# Patient Record
Sex: FEMALE | Race: WHITE | NOT HISPANIC OR LATINO | Employment: UNEMPLOYED | URBAN - METROPOLITAN AREA
[De-identification: names, ages, dates, MRNs, and addresses within clinical notes are randomized per-mention and may not be internally consistent; named-entity substitution may affect disease eponyms.]

---

## 2022-05-12 ENCOUNTER — APPOINTMENT (OUTPATIENT)
Dept: CT IMAGING | Facility: HOSPITAL | Age: 37
End: 2022-05-12

## 2022-05-12 ENCOUNTER — HOSPITAL ENCOUNTER (EMERGENCY)
Facility: HOSPITAL | Age: 37
Discharge: HOME | End: 2022-05-12
Attending: EMERGENCY MEDICINE | Admitting: EMERGENCY MEDICINE

## 2022-05-12 VITALS
WEIGHT: 160 LBS | OXYGEN SATURATION: 100 % | DIASTOLIC BLOOD PRESSURE: 93 MMHG | SYSTOLIC BLOOD PRESSURE: 141 MMHG | BODY MASS INDEX: 23.7 KG/M2 | TEMPERATURE: 98.3 F | RESPIRATION RATE: 16 BRPM | HEART RATE: 72 BPM | HEIGHT: 69 IN

## 2022-05-12 DIAGNOSIS — S09.93XA FACIAL INJURY, INITIAL ENCOUNTER: Primary | ICD-10-CM

## 2022-05-12 DIAGNOSIS — J34.89 RHINORRHEA: ICD-10-CM

## 2022-05-12 DIAGNOSIS — S01.511A LIP LACERATION, INITIAL ENCOUNTER: ICD-10-CM

## 2022-05-12 PROCEDURE — 70450 CT HEAD/BRAIN W/O DYE: CPT

## 2022-05-12 PROCEDURE — 96372 THER/PROPH/DIAG INJ SC/IM: CPT

## 2022-05-12 PROCEDURE — G0009 ADMIN PNEUMOCOCCAL VACCINE: HCPCS | Performed by: EMERGENCY MEDICINE

## 2022-05-12 PROCEDURE — 99282 EMERGENCY DEPT VISIT SF MDM: CPT | Performed by: STUDENT IN AN ORGANIZED HEALTH CARE EDUCATION/TRAINING PROGRAM

## 2022-05-12 PROCEDURE — 90732 PPSV23 VACC 2 YRS+ SUBQ/IM: CPT | Performed by: EMERGENCY MEDICINE

## 2022-05-12 PROCEDURE — 70486 CT MAXILLOFACIAL W/O DYE: CPT

## 2022-05-12 PROCEDURE — 25010000002 PNEUMOCOCCAL VAC POLYVALENT PER 0.5 ML: Performed by: EMERGENCY MEDICINE

## 2022-05-12 PROCEDURE — 99282 EMERGENCY DEPT VISIT SF MDM: CPT

## 2022-05-12 RX ADMIN — PNEUMOCOCCAL VACCINE POLYVALENT 0.5 ML
25; 25; 25; 25; 25; 25; 25; 25; 25; 25; 25; 25; 25; 25; 25; 25; 25; 25; 25; 25; 25; 25; 25 INJECTION, SOLUTION INTRAMUSCULAR; SUBCUTANEOUS at 19:26

## 2022-05-12 NOTE — CONSULTS
"Patient: Maria E Maldonado  : 1985    Primary Care Provider: Naren Harry    Requesting Provider: As above      Chief Complaint: Facial Injury      History of Present Illness: This is a 36 y.o. female who presents with concerns of CSF leak.  The patient was hit in the head by a horse on Tuesday.  She said the following day she started to have a salty taste in the back of her throat with drainage.  Today, she was working in the kitchen and noticed fluid coming out of her nose.  She Came to the Emergency Room for Work-Up.  CT Scan of her head and face showed no fracture.  Additionally, there was no pneumocephalus.  She was then postured by the emergency room doctor without any drainage from her nose.  Currently, she feels well.  She denies any sniffing and headache, vision issues or other neurological deficits.    PMHX  Allergies:  Allergies   Allergen Reactions   • Bactrim [Sulfamethoxazole-Trimethoprim] Swelling   • Tamiflu [Oseltamivir] Hives     Medications  No current facility-administered medications for this encounter.  No current outpatient medications on file.  Past Medical History:  No past medical history on file.  Past Surgical History:  No past surgical history on file.  Social Hx:     Family Hx:  No family history on file.  Review of Systems:        Negative for headache, vision changes, weakness in her arms or legs.  Positive for drainage from nose.  All other review of systems negative.    Physical Exam:   /93 (BP Location: Left arm, Patient Position: Sitting)   Pulse 72   Temp 98.3 °F (36.8 °C)   Resp 16   Ht 175.3 cm (69\")   Wt 72.6 kg (160 lb)   SpO2 100%   BMI 23.63 kg/m²   Awake, alert and oriented x 3  Speech f/c  Opens eyes spont  Pupils 3 mm rx bilaterally  Extraocular muscles intact bilaterally  Normal sensation to light touch in all 3 distributions of CN V bilaterally  Face symmetric bilaterally  Tongue midline  5/5 in all 4 ext  Normal sensation to light touch in " all 4 ext  2+DTR's  No cox's or clonus bilaterally  No pronator drift or dysmetria  Gait not assessed  No fluid drainage from nose after posturing.    Intake/Output: No intake or output data in the 24 hours ending 05/12/22 1903    Current Medications: No current facility-administered medications for this encounter.  No current outpatient medications on file.     Laboratory Results:                              Brief Urine Lab Results     None        Microbiology Results (last 10 days)     ** No results found for the last 240 hours. **           Diagnostic Imaging: Patient's diagnostic imaging was independently reviewed and interpreted by myself    Assessment/Plan:  This is a 36-year-old female who was hit in the head by a horse 2 days ago who presents with salty taste in the back of her throat as well as drainage from her nose.  In reviewing the patient's head and CT scan she has no acute fractures nor does she have any pneumocephalus.  The patient was postured in the emergency room for several minutes and had no drainage from her nose.  Its possible she had a very small CSF leak, but it is already resolving on its own.  I instructed the emergency room doctor to get the pneumococcal vaccine.  I also encouraged the patient to stay upright is much as possible.  I will have the patient return to my clinic on Tuesday to evaluate how she is doing.      Stevie Sarkar MD  05/12/22  19:03 EDT

## 2022-05-12 NOTE — ED PROVIDER NOTES
Subjective   Patient is a pleasant 36-year-old female who presents following a facial injury.  She states that she was working with a horse 2 days ago, on Tuesday.  The horse raised his head up and the horse accidentally hit the patient in the face.  She states that she initially was chelsea but did not lose consciousness.  She had some bleeding of her lip at the time but otherwise did not think too much of the injury.  She did not lose consciousness or have any fractured teeth.  Earlier today she was at home when she began to experience some drainage in the back of her throat.  When she would lean forward a clear liquid was drained from her nose.  Given the concern for possible CSF leak as she presents to the emergency department for evaluation.      Facial Injury  Mechanism of injury:  Direct blow  Location:  Face  Time since incident:  2 days  Pain details:     Quality:  Aching    Severity:  Mild    Timing:  Rare    Progression:  Resolved  Relieved by:  Nothing  Worsened by:  Nothing  Ineffective treatments:  None tried  Associated symptoms: rhinorrhea    Associated symptoms: no congestion, no difficulty breathing, no double vision, no epistaxis, no headaches, no loss of consciousness, no nausea, no vomiting and no wheezing        Review of Systems   HENT: Positive for rhinorrhea. Negative for congestion and nosebleeds.    Eyes: Negative for double vision.   Respiratory: Negative for wheezing.    Gastrointestinal: Negative for nausea and vomiting.   Neurological: Negative for loss of consciousness and headaches.   All other systems reviewed and are negative.      No past medical history on file.    Allergies   Allergen Reactions   • Bactrim [Sulfamethoxazole-Trimethoprim] Swelling   • Tamiflu [Oseltamivir] Hives       No past surgical history on file.    No family history on file.    Social History     Socioeconomic History   • Marital status:            Objective   Physical Exam  Vitals and nursing note  reviewed.   Constitutional:       General: She is not in acute distress.     Appearance: Normal appearance. She is not ill-appearing, toxic-appearing or diaphoretic.   HENT:      Head: Normocephalic.        Comments: Shallow, small laceration to her inferior lip.  Appears to be healing well.  No active bleeding.    No swelling or tenderness to palpation of the nose or over the maxillary sinuses.     Nose: Nose normal.      Comments: No tenderness to palpation of the nose.  No swelling or sign of direct trauma.  Currently there is no active rhinorrhea.     Mouth/Throat:      Mouth: Mucous membranes are moist.   Eyes:      Extraocular Movements: Extraocular movements intact.      Pupils: Pupils are equal, round, and reactive to light.   Pulmonary:      Effort: Pulmonary effort is normal. No respiratory distress.   Musculoskeletal:      Cervical back: Normal range of motion.   Skin:     General: Skin is warm and dry.      Coloration: Skin is not pale.      Findings: No rash.   Neurological:      General: No focal deficit present.      Mental Status: She is alert and oriented to person, place, and time. Mental status is at baseline.   Psychiatric:         Mood and Affect: Mood normal.         Behavior: Behavior normal.         Thought Content: Thought content normal.         Procedures           ED Course          CT Head Without Contrast   Final Result       1. No acute intracranial abnormality.   2. Small air-fluid levels within the maxillary sinuses bilaterally   without evidence of acute fracture identified.       Maxillofacial bones appear intact.       This report was finalized on 5/12/2022 5:21 PM by John Davison.          CT Facial Bones Without Contrast   Final Result       1. No acute intracranial abnormality.   2. Small air-fluid levels within the maxillary sinuses bilaterally   without evidence of acute fracture identified.       Maxillofacial bones appear intact.       This report was finalized on  "5/12/2022 5:21 PM by John Davison.            Vitals:    05/12/22 1617   BP: 141/93   BP Location: Left arm   Patient Position: Sitting   Pulse: 72   Resp: 16   Temp: 98.3 °F (36.8 °C)   SpO2: 100%   Weight: 72.6 kg (160 lb)   Height: 175.3 cm (69\")     Medications   pneumococcal polysaccharide 23-valent (PNEUMOVAX-23) vaccine 0.5 mL (0.5 mL Intramuscular Given 5/12/22 1926)     ECG/EMG Results (last 24 hours)     ** No results found for the last 24 hours. **        No orders to display     I discussed the case with Dr. Sarkar, neurosurgery.  He was very helpful and examined the patient in the ED.  No current rhinorrhea was present to test.  He recommends a pneumococcal vaccine and follow up in his office on Tuesday.  Pt understands to have a low threshold to return to the ED if symptoms persist, worsen, or other concerns arise.                                             MDM    Final diagnoses:   Facial injury, initial encounter   Rhinorrhea   Lip laceration, initial encounter       ED Disposition  ED Disposition     ED Disposition   Discharge    Condition   Stable    Comment   --           DISCHARGE    Patient discharged in stable condition.    Reviewed implications of results, diagnosis, meds, responsibility to follow up, warning signs and symptoms of possible worsening, potential complications and reasons to return to ER.    Patient/Family voiced understanding of above instructions.    Discussed plan for discharge, as there is no emergent indication for admission.  Pt/family is agreeable and understands need for follow up and possible repeat testing.  Pt/family is aware that discharge does not mean that nothing is wrong but that it indicates no emergency is currently present that requires admission and they must continue care with follow-up as given below or with a physician of their choice.     FOLLOW-UP  Stevie Sarkar MD  [address] [id number] [phone]    Schedule " an appointment as soon as possible for a visit in 5 days  on Tuesday, 5/17    [facility] Emergency Department  [address] [facility] [address] [phone]    If symptoms worsen         Medication List      No changes were made to your prescriptions during this visit.           Stevie Sarkar MD  [address] [address]  [phone]    Schedule an appointment as soon as possible for a visit in 5 days  on Tuesday, 5/17    [facility] Emergency Department  [address] [facility] [address] [phone]    If symptoms worsen         Medication List      No changes were made to your prescriptions during this visit.          Jd Ambrocio DO  05/13/22 1407

## 2022-05-12 NOTE — DISCHARGE INSTRUCTIONS
Follow-up in 's office on Tuesday.  Avoid strenuous activities until this appointment and until cleared by neurosurgery.  Have a low threshold to return to the emergency department if symptoms worsen or other concerns arise.

## 2022-05-17 ENCOUNTER — OFFICE VISIT (OUTPATIENT)
Dept: NEUROSURGERY | Facility: CLINIC | Age: 37
End: 2022-05-17

## 2022-05-17 VITALS
TEMPERATURE: 97.1 F | WEIGHT: 157 LBS | DIASTOLIC BLOOD PRESSURE: 60 MMHG | BODY MASS INDEX: 23.25 KG/M2 | SYSTOLIC BLOOD PRESSURE: 104 MMHG | HEIGHT: 69 IN

## 2022-05-17 DIAGNOSIS — G96.00 CSF LEAK: Primary | ICD-10-CM

## 2022-05-17 PROCEDURE — 99213 OFFICE O/P EST LOW 20 MIN: CPT | Performed by: STUDENT IN AN ORGANIZED HEALTH CARE EDUCATION/TRAINING PROGRAM

## 2022-05-17 RX ORDER — FLUOXETINE HYDROCHLORIDE 20 MG/1
CAPSULE ORAL
COMMUNITY

## 2022-05-17 RX ORDER — LAMOTRIGINE 200 MG/1
TABLET, EXTENDED RELEASE ORAL
COMMUNITY

## 2022-05-17 NOTE — PROGRESS NOTES
Patient: Maria E Maldonado  : 1985    Primary Care Provider: Naren Harry    Requesting Provider: As above      Chief Complaint: Hospital Follow Up Visit (Possible CSF leak from facial injury - hit in face by horse)      History of Present Illness: This is a 36 y.o. female who I evaluated the emergency room last week after she was hit in the head by a horse.  She had presented with clear fluid draining from her nose, but in the emergency room we were unable to observe any when posturing.  She was discharged home and has been trying to stay upright.  She comes in today for follow-up.  She states she has not had any drainage or salty taste in the back of her throat.  She denies any drainage from her nose.  Overall, she has been feeling well.    PMHX  Allergies:  Allergies   Allergen Reactions   • Bactrim [Sulfamethoxazole-Trimethoprim] Swelling   • Oseltamivir Hives     Medications    Current Outpatient Medications:   •  D-MANNOSE PO, Take  by mouth., Disp: , Rfl:   •  FLUoxetine (PROzac) 20 MG capsule, Prozac 20 mg capsule  Daily, Disp: , Rfl:   •  Glucosamine HCl (GLUCOSAMINE PO), Take  by mouth., Disp: , Rfl:   •  lamoTRIgine  MG tablet sustained-release 24 hour, Lamictal  mg tablet,extended release  Daily, Disp: , Rfl:   Past Medical History:  Past Medical History:   Diagnosis Date   • Medical history non-contributory      Past Surgical History:  Past Surgical History:   Procedure Laterality Date   • LEEP       Social Hx:  Social History     Tobacco Use   • Smoking status: Former Smoker     Years: 9.00     Quit date:      Years since quittin.3   • Smokeless tobacco: Never Used   Vaping Use   • Vaping Use: Never used   Substance Use Topics   • Alcohol use: Never   • Drug use: Never     Family Hx:  Family History   Problem Relation Age of Onset   • Hypertension Father      Review of Systems:        Review of Systems   Constitutional: Negative for activity change, appetite change,  "chills, diaphoresis, fatigue, fever and unexpected weight change.   HENT: Negative for congestion, dental problem, drooling, ear discharge, ear pain, facial swelling, hearing loss, mouth sores, nosebleeds, postnasal drip, rhinorrhea, sinus pressure, sneezing, sore throat, tinnitus, trouble swallowing and voice change.    Eyes: Negative for photophobia, pain, discharge, redness, itching and visual disturbance.   Respiratory: Negative for apnea, cough, choking, chest tightness, shortness of breath, wheezing and stridor.    Cardiovascular: Negative for chest pain, palpitations and leg swelling.   Gastrointestinal: Negative for abdominal distention, abdominal pain, anal bleeding, blood in stool, constipation, diarrhea, nausea, rectal pain and vomiting.   Endocrine: Negative for cold intolerance, heat intolerance, polydipsia, polyphagia and polyuria.   Genitourinary: Negative for decreased urine volume, difficulty urinating, dysuria, enuresis, flank pain, frequency, genital sores, hematuria and urgency.   Musculoskeletal: Negative for arthralgias, back pain, gait problem, joint swelling, myalgias, neck pain and neck stiffness.   Skin: Negative for color change, pallor, rash and wound.   Allergic/Immunologic: Negative for environmental allergies, food allergies and immunocompromised state.   Neurological: Negative for dizziness, tremors, seizures, syncope, facial asymmetry, speech difficulty, weakness, light-headedness, numbness and headaches.   Hematological: Negative for adenopathy. Does not bruise/bleed easily.   Psychiatric/Behavioral: Negative for agitation, behavioral problems, confusion, decreased concentration, dysphoric mood, hallucinations, self-injury, sleep disturbance and suicidal ideas. The patient is not nervous/anxious and is not hyperactive.         Physical Exam:   /60 (BP Location: Left arm, Patient Position: Sitting, Cuff Size: Adult)   Temp 97.1 °F (36.2 °C) (Infrared)   Ht 175.3 cm (69\")   " Wt 71.2 kg (157 lb)   BMI 23.18 kg/m²   Awake, alert and oriented x 3  Speech f/c  Opens eyes spont  Pupils 3 mm rx bilaterally  Extraocular muscles intact bilaterally  Normal sensation to light touch in all 3 distributions of CN V bilaterally  Face symmetric bilaterally  Tongue midline  5/5 in all 4 ext  No drainage from nose appreciated    Diagnostic Studies:  All neurological imaging studies were independently reviewed unless stated otherwise    Assessment/Plan:  This is a 36 y.o. female presenting for follow-up after being hit in the head by horse.  The patient has had no drainage from her nose or in the back of her throat for several days.  She may have had a very small CSF leak initially, but this has clearly resolved and sealed off.  At this time, we will not schedule any further follow-up, but I did tell her to give me a call if she develops any new or worsening drainage.    Diagnoses and all orders for this visit:    1. CSF leak (Primary)        Stevie Sarkar MD  05/17/22  15:33 EDT

## 2024-12-15 ENCOUNTER — APPOINTMENT (OUTPATIENT)
Dept: GENERAL RADIOLOGY | Facility: HOSPITAL | Age: 39
End: 2024-12-15
Payer: COMMERCIAL

## 2024-12-15 ENCOUNTER — HOSPITAL ENCOUNTER (EMERGENCY)
Facility: HOSPITAL | Age: 39
Discharge: HOME | End: 2024-12-15
Attending: EMERGENCY MEDICINE | Admitting: EMERGENCY MEDICINE
Payer: COMMERCIAL

## 2024-12-15 VITALS
WEIGHT: 164 LBS | RESPIRATION RATE: 18 BRPM | OXYGEN SATURATION: 99 % | BODY MASS INDEX: 23.48 KG/M2 | TEMPERATURE: 98.1 F | HEIGHT: 70 IN | SYSTOLIC BLOOD PRESSURE: 147 MMHG | DIASTOLIC BLOOD PRESSURE: 77 MMHG | HEART RATE: 65 BPM

## 2024-12-15 DIAGNOSIS — R07.9 NONSPECIFIC CHEST PAIN: Primary | ICD-10-CM

## 2024-12-15 DIAGNOSIS — S29.011A MUSCLE STRAIN OF CHEST WALL, INITIAL ENCOUNTER: ICD-10-CM

## 2024-12-15 LAB
ALBUMIN SERPL-MCNC: 4.3 G/DL (ref 3.5–5.2)
ALBUMIN/GLOB SERPL: 1.7 G/DL
ALP SERPL-CCNC: 39 U/L (ref 39–117)
ALT SERPL W P-5'-P-CCNC: 13 U/L (ref 1–33)
ANION GAP SERPL CALCULATED.3IONS-SCNC: 11 MMOL/L (ref 5–15)
AST SERPL-CCNC: 18 U/L (ref 1–32)
BASOPHILS # BLD AUTO: 0.05 10*3/MM3 (ref 0–0.2)
BASOPHILS NFR BLD AUTO: 0.8 % (ref 0–1.5)
BILIRUB SERPL-MCNC: 0.4 MG/DL (ref 0–1.2)
BUN SERPL-MCNC: 7 MG/DL (ref 6–20)
BUN/CREAT SERPL: 9.7 (ref 7–25)
CALCIUM SPEC-SCNC: 9.4 MG/DL (ref 8.6–10.5)
CHLORIDE SERPL-SCNC: 102 MMOL/L (ref 98–107)
CO2 SERPL-SCNC: 24 MMOL/L (ref 22–29)
CREAT SERPL-MCNC: 0.72 MG/DL (ref 0.57–1)
D DIMER PPP FEU-MCNC: <0.27 MCGFEU/ML (ref 0–0.5)
DEPRECATED RDW RBC AUTO: 43 FL (ref 37–54)
EGFRCR SERPLBLD CKD-EPI 2021: 107.9 ML/MIN/1.73
EOSINOPHIL # BLD AUTO: 0.21 10*3/MM3 (ref 0–0.4)
EOSINOPHIL NFR BLD AUTO: 3.2 % (ref 0.3–6.2)
ERYTHROCYTE [DISTWIDTH] IN BLOOD BY AUTOMATED COUNT: 13.2 % (ref 12.3–15.4)
GEN 5 1HR TROPONIN T REFLEX: <6 NG/L
GLOBULIN UR ELPH-MCNC: 2.5 GM/DL
GLUCOSE SERPL-MCNC: 102 MG/DL (ref 65–99)
HCT VFR BLD AUTO: 41.1 % (ref 34–46.6)
HGB BLD-MCNC: 13.8 G/DL (ref 12–15.9)
HOLD SPECIMEN: NORMAL
IMM GRANULOCYTES # BLD AUTO: 0.01 10*3/MM3 (ref 0–0.05)
IMM GRANULOCYTES NFR BLD AUTO: 0.2 % (ref 0–0.5)
LIPASE SERPL-CCNC: 15 U/L (ref 13–60)
LYMPHOCYTES # BLD AUTO: 2.03 10*3/MM3 (ref 0.7–3.1)
LYMPHOCYTES NFR BLD AUTO: 31.1 % (ref 19.6–45.3)
MCH RBC QN AUTO: 29.9 PG (ref 26.6–33)
MCHC RBC AUTO-ENTMCNC: 33.6 G/DL (ref 31.5–35.7)
MCV RBC AUTO: 89.2 FL (ref 79–97)
MONOCYTES # BLD AUTO: 0.49 10*3/MM3 (ref 0.1–0.9)
MONOCYTES NFR BLD AUTO: 7.5 % (ref 5–12)
NEUTROPHILS NFR BLD AUTO: 3.74 10*3/MM3 (ref 1.7–7)
NEUTROPHILS NFR BLD AUTO: 57.2 % (ref 42.7–76)
NRBC BLD AUTO-RTO: 0 /100 WBC (ref 0–0.2)
NT-PROBNP SERPL-MCNC: 137.6 PG/ML (ref 0–450)
PLATELET # BLD AUTO: 280 10*3/MM3 (ref 140–450)
PMV BLD AUTO: 9.5 FL (ref 6–12)
POTASSIUM SERPL-SCNC: 3.7 MMOL/L (ref 3.5–5.2)
PROT SERPL-MCNC: 6.8 G/DL (ref 6–8.5)
QT INTERVAL: 412 MS
QT INTERVAL: 428 MS
QTC INTERVAL: 428 MS
QTC INTERVAL: 437 MS
RBC # BLD AUTO: 4.61 10*6/MM3 (ref 3.77–5.28)
SODIUM SERPL-SCNC: 137 MMOL/L (ref 136–145)
TROPONIN T NUMERIC DELTA: NORMAL
TROPONIN T SERPL HS-MCNC: <6 NG/L
WBC NRBC COR # BLD AUTO: 6.53 10*3/MM3 (ref 3.4–10.8)
WHOLE BLOOD HOLD COAG: NORMAL
WHOLE BLOOD HOLD SPECIMEN: NORMAL

## 2024-12-15 PROCEDURE — 99284 EMERGENCY DEPT VISIT MOD MDM: CPT

## 2024-12-15 PROCEDURE — 36415 COLL VENOUS BLD VENIPUNCTURE: CPT

## 2024-12-15 PROCEDURE — 84484 ASSAY OF TROPONIN QUANT: CPT | Performed by: EMERGENCY MEDICINE

## 2024-12-15 PROCEDURE — 93005 ELECTROCARDIOGRAM TRACING: CPT | Performed by: EMERGENCY MEDICINE

## 2024-12-15 PROCEDURE — 85025 COMPLETE CBC W/AUTO DIFF WBC: CPT | Performed by: EMERGENCY MEDICINE

## 2024-12-15 PROCEDURE — 83880 ASSAY OF NATRIURETIC PEPTIDE: CPT | Performed by: EMERGENCY MEDICINE

## 2024-12-15 PROCEDURE — 85379 FIBRIN DEGRADATION QUANT: CPT | Performed by: EMERGENCY MEDICINE

## 2024-12-15 PROCEDURE — 80053 COMPREHEN METABOLIC PANEL: CPT | Performed by: EMERGENCY MEDICINE

## 2024-12-15 PROCEDURE — 83690 ASSAY OF LIPASE: CPT | Performed by: EMERGENCY MEDICINE

## 2024-12-15 PROCEDURE — 71045 X-RAY EXAM CHEST 1 VIEW: CPT

## 2024-12-15 PROCEDURE — 93005 ELECTROCARDIOGRAM TRACING: CPT

## 2024-12-15 RX ORDER — SODIUM CHLORIDE 0.9 % (FLUSH) 0.9 %
10 SYRINGE (ML) INJECTION AS NEEDED
Status: DISCONTINUED | OUTPATIENT
Start: 2024-12-15 | End: 2024-12-15 | Stop reason: HOSPADM

## 2024-12-15 RX ORDER — ASPIRIN 81 MG/1
324 TABLET, CHEWABLE ORAL ONCE
Status: COMPLETED | OUTPATIENT
Start: 2024-12-15 | End: 2024-12-15

## 2024-12-15 RX ADMIN — ASPIRIN 81 MG 243 MG: 81 TABLET ORAL at 14:53

## 2024-12-15 NOTE — ED PROVIDER NOTES
" EMERGENCY DEPARTMENT ENCOUNTER    Pt Name: Maria E Maldonado  MRN: [id number]  Pt :   1985  Room Number:  10/10  Date of encounter:  12/15/2024  PCP: Naren Harry  ED Provider: Gurmeet Duggan MD    Historian: Patient and her  friend      HPI:  Chief Complaint: Chest pain        Context: Maria E Maldonado is a 39 y.o. female who presents to the ED c/o left-sided chest pain starting around noon today.  The patient felt what she thought was \"gas pains\" in her chest.  She noted increased discomfort with palpation of her chest wall, movements of her left upper extremity.  She has been decorating and moving some [employer] boxes but did not lift anything overly heavy recently.  She has had no cough or congestion and no sneezing.  No history of DVT/PE.  No first-degree relatives with coronary artery disease history.      PAST MEDICAL HISTORY  Past Medical History:   Diagnosis Date    Medical history non-contributory          PAST SURGICAL HISTORY  Past Surgical History:   Procedure Laterality Date    LEEP           FAMILY HISTORY  Family History   Problem Relation Age of Onset    Hypertension Father          SOCIAL HISTORY  Social History     Socioeconomic History    Marital status:    Tobacco Use    Smoking status: Former     Current packs/day: 0.00     Types: Cigarettes     Start date:      Quit date: 2008     Years since quittin.9    Smokeless tobacco: Never   Vaping Use    Vaping status: Never Used   Substance and Sexual Activity    Alcohol use: Never    Drug use: Never    Sexual activity: Defer         ALLERGIES  Bactrim [sulfamethoxazole-trimethoprim] and Oseltamivir        REVIEW OF SYSTEMS  Review of Systems       All systems reviewed and negative except for those discussed in HPI.       PHYSICAL EXAM    I have reviewed the triage vital signs and nursing notes.    ED Triage Vitals [12/15/24 1254]   Temp Heart Rate Resp BP SpO2   98.1 °F (36.7 °C) 68 14 149/84 100 %    "   Temp src Heart Rate Source Patient Position BP Location FiO2 (%)   Oral Monitor -- -- --       Physical Exam  GENERAL:   Appears in no acute distress.  Pleasant and very healthy appearing.  HENT: Nares patent.  EYES: No scleral icterus.  CV: Regular rhythm, regular rate.  No murmurs gallops rubs  RESPIRATORY: Normal effort.  No audible wheezes, rales or rhonchi.  Clear to auscultation all fields anteriorly and posteriorly  ABDOMEN: Soft, nontender to deep palpation in the epigastric region.  MUSCULOSKELETAL: No deformities.  Chest wall tenderness is reproducible.  NEURO: Alert, moves all extremities, follows commands.  SKIN: Warm, dry, no rash visualized.      LAB RESULTS  Recent Results (from the past 24 hours)   ECG 12 Lead ED Triage Standing Order; Chest Pain    Collection Time: 12/15/24  1:02 PM   Result Value Ref Range    QT Interval 412 ms    QTC Interval 428 ms   High Sensitivity Troponin T    Collection Time: 12/15/24  1:14 PM    Specimen: Blood   Result Value Ref Range    HS Troponin T <6 <14 ng/L   Comprehensive Metabolic Panel    Collection Time: 12/15/24  1:14 PM    Specimen: Blood   Result Value Ref Range    Glucose 102 (H) 65 - 99 mg/dL    BUN 7 6 - 20 mg/dL    Creatinine 0.72 0.57 - 1.00 mg/dL    Sodium 137 136 - 145 mmol/L    Potassium 3.7 3.5 - 5.2 mmol/L    Chloride 102 98 - 107 mmol/L    CO2 24.0 22.0 - 29.0 mmol/L    Calcium 9.4 8.6 - 10.5 mg/dL    Total Protein 6.8 6.0 - 8.5 g/dL    Albumin 4.3 3.5 - 5.2 g/dL    ALT (SGPT) 13 1 - 33 U/L    AST (SGOT) 18 1 - 32 U/L    Alkaline Phosphatase 39 39 - 117 U/L    Total Bilirubin 0.4 0.0 - 1.2 mg/dL    Globulin 2.5 gm/dL    A/G Ratio 1.7 g/dL    BUN/Creatinine Ratio 9.7 7.0 - 25.0    Anion Gap 11.0 5.0 - 15.0 mmol/L    eGFR 107.9 >60.0 mL/min/1.73   Lipase    Collection Time: 12/15/24  1:14 PM    Specimen: Blood   Result Value Ref Range    Lipase 15 13 - 60 U/L   BNP    Collection Time: 12/15/24  1:14 PM    Specimen: Blood   Result Value Ref Range     proBNP 137.6 0.0 - 450.0 pg/mL   Green Top (Gel)    Collection Time: 12/15/24  1:14 PM   Result Value Ref Range    Extra Tube Hold for add-ons.    Lavender Top    Collection Time: 12/15/24  1:14 PM   Result Value Ref Range    Extra Tube hold for add-on    Gold Top - SST    Collection Time: 12/15/24  1:14 PM   Result Value Ref Range    Extra Tube Hold for add-ons.    Gray Top    Collection Time: 12/15/24  1:14 PM   Result Value Ref Range    Extra Tube Hold for add-ons.    Light Blue Top    Collection Time: 12/15/24  1:14 PM   Result Value Ref Range    Extra Tube Hold for add-ons.    CBC Auto Differential    Collection Time: 12/15/24  1:14 PM    Specimen: Blood   Result Value Ref Range    WBC 6.53 3.40 - 10.80 10*3/mm3    RBC 4.61 3.77 - 5.28 10*6/mm3    Hemoglobin 13.8 12.0 - 15.9 g/dL    Hematocrit 41.1 34.0 - 46.6 %    MCV 89.2 79.0 - 97.0 fL    MCH 29.9 26.6 - 33.0 pg    MCHC 33.6 31.5 - 35.7 g/dL    RDW 13.2 12.3 - 15.4 %    RDW-SD 43.0 37.0 - 54.0 fl    MPV 9.5 6.0 - 12.0 fL    Platelets 280 140 - 450 10*3/mm3    Neutrophil % 57.2 42.7 - 76.0 %    Lymphocyte % 31.1 19.6 - 45.3 %    Monocyte % 7.5 5.0 - 12.0 %    Eosinophil % 3.2 0.3 - 6.2 %    Basophil % 0.8 0.0 - 1.5 %    Immature Grans % 0.2 0.0 - 0.5 %    Neutrophils, Absolute 3.74 1.70 - 7.00 10*3/mm3    Lymphocytes, Absolute 2.03 0.70 - 3.10 10*3/mm3    Monocytes, Absolute 0.49 0.10 - 0.90 10*3/mm3    Eosinophils, Absolute 0.21 0.00 - 0.40 10*3/mm3    Basophils, Absolute 0.05 0.00 - 0.20 10*3/mm3    Immature Grans, Absolute 0.01 0.00 - 0.05 10*3/mm3    nRBC 0.0 0.0 - 0.2 /100 WBC   D-dimer, Quantitative    Collection Time: 12/15/24  1:14 PM    Specimen: Blood   Result Value Ref Range    D-Dimer, Quantitative <0.27 0.00 - 0.50 MCGFEU/mL   High Sensitivity Troponin T 1Hr    Collection Time: 12/15/24  2:25 PM    Specimen: Blood   Result Value Ref Range    HS Troponin T <6 <14 ng/L    Troponin T Delta     ECG 12 Lead ED Triage Standing Order; Chest Pain     Collection Time: 12/15/24  2:57 PM   Result Value Ref Range    QT Interval 428 ms    QTC Interval 437 ms       If labs were ordered, I independently reviewed the results and considered them in treating the patient.        RADIOLOGY  XR Chest 1 View    Result Date: 12/15/2024  XR CHEST 1 VW Date of Exam: 12/15/2024 1:21 PM EST Indication: Chest Pain Triage Protocol Comparison: None available. Findings: The cardiomediastinal silhouette is within normal limits. Lungs are clear. No focal consolidation, pneumothorax, or significant pleural effusion. Osseous structures grossly intact. Convex left levocurvature of the upper thoracic spine convex right dextrocurvature of the lower thoracic spine.     Impression: No acute process. Electronically Signed: Denis De La Rosa MD  12/15/2024 1:50 PM EST  Workstation ID: [id number]     I ordered and independently reviewed the above noted radiographic studies.      I viewed images of chest x-ray which showed no acute disease per my independent interpretation.    See radiologist's dictation for official interpretation.        PROCEDURES    Procedures    ECG 12 Lead ED Triage Standing Order; Chest Pain   Final Result   Test Reason : ED Triage Standing Order~   Blood Pressure :   */*   mmHG   Vent. Rate :  63 BPM     Atrial Rate :  63 BPM      P-R Int : 106 ms          QRS Dur : 102 ms       QT Int : 428 ms       P-R-T Axes :  36  41  40 degrees     QTcB Int : 437 ms      Sinus rhythm with short [place]   Otherwise normal ECG   When compared with ECG of 15-Dec-2024 13:02, (Unconfirmed)   No significant change was found   Confirmed by DREW REYES MD ([id number]) on 12/15/2024 3:13:51 PM      Referred By: EDMD           Confirmed By: DREW REYES MD      ECG 12 Lead ED Triage Standing Order; Chest Pain   Final Result   Test Reason : ED Triage Standing Order~   Blood Pressure :   */*   mmHG   Vent. Rate :  65 BPM     Atrial Rate :  65 BPM      P-R Int : 116 ms          QRS Dur : 100 ms       QT Int : 412  ms       P-R-T Axes :   4  43  28 degrees     QTcB Int : 428 ms      Normal sinus rhythm   Normal ECG   No previous ECGs available   Confirmed by DREW REYES MD ([id number]) on 12/15/2024 3:13:40 PM      Referred By: ED MD           Confirmed By: DREW REYES MD          MEDICATIONS GIVEN IN ER    Medications   sodium chloride 0.9 % flush 10 mL (has no administration in time range)   aspirin chewable tablet 324 mg (243 mg Oral Given 12/15/24 1453)         MEDICAL DECISION MAKING, PROGRESS, and CONSULTS    All labs, if obtained, have been independently reviewed by me.  All radiology studies, if obtained, have been reviewed by me and the radiologist dictating the report.  All EKG's, if obtained, have been independently viewed and interpreted by me/my attending physician.      Discussion below represents my analysis of pertinent findings related to patient's condition, differential diagnosis, treatment plan and final disposition.              HEART Score: 0   Shared Decision Making  I discussed the findings with the patient/patient representative who is in agreement with the treatment plan and the final disposition.  Risks and benefits of discharge and/or observation/admission were discussed: Yes                             Differential diagnosis:    Chest wall strain versus acute coronary syndrome versus pulmonary embolism, etc.      Additional sources:    - Discussed/ obtained information from independent historians: I spoke with the patient's  friend at bedside who provided some information directly to me.    - External (non-ED) record review: Reviewed prior records to include women's health note dated 8/26/2024, routine.    - Chronic or social conditions impacting care: Healthy lifestyle.  Smoked for roughly 4 years, very remote.  No recreational drug use.  Very limited alcohol use.        Orders placed during this visit:  Orders Placed This Encounter   Procedures    XR Chest 1 View    [place] Draw    High  Sensitivity Troponin T    Comprehensive Metabolic Panel    Lipase    BNP    CBC Auto Differential    High Sensitivity Troponin T 1Hr    D-dimer, Quantitative    NPO Diet NPO Type: Strict NPO    Undress & Gown    Continuous Pulse Oximetry    Oxygen Therapy- Nasal Cannula; Titrate 1-6 LPM Per SpO2; 90 - 95%    ECG 12 Lead ED Triage Standing Order; Chest Pain    ECG 12 Lead ED Triage Standing Order; Chest Pain    Insert Peripheral IV    CBC & Differential    Green Top (Gel)    Lavender Top    Gold Top - SST    Gray Top    Light Blue Top         Additional orders considered but not ordered:  CTA chest    ED Course:    Consultants:                  Shared Decision Making:  After my consideration of clinical presentation and any laboratory/radiology studies obtained, I discussed the findings with the patient/patient representative who is in agreement with the treatment plan and the final disposition.   Risks and benefits of discharge and/or observation/admission were discussed.       AS OF 15:18 EST VITALS:    BP - 147/77  HR - 65  TEMP - 98.1 °F (36.7 °C) (Oral)  O2 SATS - 99%                  DIAGNOSIS  Final diagnoses:   Nonspecific chest pain   Muscle strain of chest wall, initial encounter         DISPOSITION  DISCHARGE    Patient discharged in stable condition.    Reviewed implications of results, diagnosis, meds, responsibility to follow up, warning signs and symptoms of possible worsening, potential complications and reasons to return to ER.    Patient/Family voiced understanding of above instructions.    Discussed plan for discharge, as there is no emergent indication for admission.  Pt/family is agreeable and understands need for follow up and possible repeat testing.  Pt/family is aware that discharge does not mean that nothing is wrong but that it indicates no emergency is currently present that requires admission and they must continue care with follow-up as given below or with a physician of their choice.      FOLLOW-UP  Naren Harry  [address] [facility] [address]  [phone]          [facility] EMERGENCY DEPARTMENT  [address]  [facility] [address] [phone]    IF YOU HAVE ANY CONCERN OF WORSENING CONDITION         Medication List        Stop      D-MANNOSE PO     GLUCOSAMINE PO                  Please note that portions of this document were completed with voice recognition software.        Gurmeet Duggan MD  12/16/24 4121